# Patient Record
(demographics unavailable — no encounter records)

---

## 2024-10-10 NOTE — HISTORY OF PRESENT ILLNESS
[N/A] : N/A [Denies] : Denies [No] : No [Yes] : Yes [Declined] : Declined [Left upper extremity] : Left upper extremity [24g] : 24g [Start Time: ___] : Medication Start Time: [unfilled] [End Time: ___] : Medication End Time: [unfilled] [Medication Name: ___] : Medication Name: [unfilled] [Total Amount Administered: ___] : Total Amount Administered: [unfilled] [IV discontinued. Intact. No signs or symptoms of IV complications noted. Time: ___] : IV discontinued. Intact. No signs or symptoms of IV complications noted. Time: [unfilled] [Patient  instructed to seek medical attention with signs and symptoms of adverse effects] : Patient  instructed to seek medical attention with signs and symptoms of adverse effects [Patient left unit in no acute distress] : Patient left unit in no acute distress [Medications administered as ordered and tolerated well.] : Medications administered as ordered and tolerated well. [Blood drawn at time of visit] : Blood drawn at time of visit [Influenza] : Influenza [de-identified] : Left cephalic [de-identified] : Patient presents for Orencia infusion. Patient reports to be doing well, denies any siginificant changes since last visit. Patient denies of having any complaints or symptoms today. Denies recent infection or abx use. Patient requested for flu vaccination; administered on left deltoid IM. Patient tolerated well, no immediate AEs noted. Patient tolerated infusion well, left unit ambulatory in [facility].

## 2024-11-07 NOTE — HISTORY OF PRESENT ILLNESS
[N/A] : N/A [Denies] : Denies [No] : No [Yes] : Yes [Declined] : Declined [Left upper extremity] : Left upper extremity [24g] : 24g [Start Time: ___] : Medication Start Time: [unfilled] [End Time: ___] : Medication End Time: [unfilled] [Medication Name: ___] : Medication Name: [unfilled] [Total Amount Administered: ___] : Total Amount Administered: [unfilled] [IV discontinued. Intact. No signs or symptoms of IV complications noted. Time: ___] : IV discontinued. Intact. No signs or symptoms of IV complications noted. Time: [unfilled] [Patient  instructed to seek medical attention with signs and symptoms of adverse effects] : Patient  instructed to seek medical attention with signs and symptoms of adverse effects [Patient left unit in no acute distress] : Patient left unit in no acute distress [Medications administered as ordered and tolerated well.] : Medications administered as ordered and tolerated well. [Blood drawn at time of visit] : Blood drawn at time of visit [de-identified] : Left cephalic [de-identified] : Patient presents for Orencia infusion. Patient reports to be doing well, denies any significant changes since last visit. Patient denies of having any complaints or symptoms today. Denies recent infection or abx use. Patient tolerated infusion well, left unit ambulatory in [facility].

## 2024-12-05 NOTE — HISTORY OF PRESENT ILLNESS
[N/A] : N/A [Denies] : Denies [No] : No [Yes] : Yes [Declined] : Declined [Left upper extremity] : Left upper extremity [24g] : 24g [Medication Name: ___] : Medication Name: [unfilled] [Total Amount Administered: ___] : Total Amount Administered: [unfilled] [Patient  instructed to seek medical attention with signs and symptoms of adverse effects] : Patient  instructed to seek medical attention with signs and symptoms of adverse effects [Patient left unit in no acute distress] : Patient left unit in no acute distress [Medications administered as ordered and tolerated well.] : Medications administered as ordered and tolerated well. [Start Time: ___] : Medication Start Time: [unfilled] [End Time: ___] : Medication End Time: [unfilled] [IV discontinued. Intact. No signs or symptoms of IV complications noted. Time: ___] : IV discontinued. Intact. No signs or symptoms of IV complications noted. Time: [unfilled] [de-identified] : Left cephalic [de-identified] : Patient presents for scheduled Orencia infusion in [facility]. Patient reports to be doing well, denies any significant changes since last visit. Patient denies any recent infections, ABX or hospitalizations. Patient admits to morning stiffness lasting 15-20 minutes each day, but states it gets better as she gets moving.  Patient tolerated infusion well, left unit ambulatory in [facility] upon medication completion.

## 2025-01-16 NOTE — HISTORY OF PRESENT ILLNESS
[N/A] : N/A [Denies] : Denies [No] : No [Yes] : Yes [Informed consent documented in EHR.] : Informed consent documented in EHR. [Left upper extremity] : Left upper extremity [24g] : 24g [Start Time: ___] : Medication Start Time: [unfilled] [End Time: ___] : Medication End Time: [unfilled] [Medication Name: ___] : Medication Name: [unfilled] [Total Amount Administered: ___] : Total Amount Administered: [unfilled] [IV discontinued. Intact. No signs or symptoms of IV complications noted. Time: ___] : IV discontinued. Intact. No signs or symptoms of IV complications noted. Time: [unfilled] [Patient  instructed to seek medical attention with signs and symptoms of adverse effects] : Patient  instructed to seek medical attention with signs and symptoms of adverse effects [Patient left unit in no acute distress] : Patient left unit in no acute distress [Medications administered as ordered and tolerated well.] : Medications administered as ordered and tolerated well. [Blood drawn at time of visit] : Blood drawn at time of visit [de-identified] : flu about 2 weeks ago  [de-identified] : Left arm cephalic vein  [de-identified] :  Labs drawn as prescribed. [de-identified] : Patient presents for scheduled Orencia infusion in [facility]. Patient reports having flu, at first, she was treated with ABX and then steroids were added d/t severe dry cough. Today, patient reports to be doing well, denies joints pain, swelling or stiffness. Patient tolerated infusion well, left unit ambulatory in [facility] upon medication completion.

## 2025-02-13 NOTE — REASON FOR VISIT
[Patient presents for infusion] : infusion therapy as documented below Implemented All Universal Safety Interventions:  [place] to call system. Call bell, personal items and telephone within reach. Instruct patient to call for assistance. Room bathroom lighting operational. Non-slip footwear when patient is off stretcher. Physically safe environment: no spills, clutter or unnecessary equipment. Stretcher in lowest position, wheels locked, appropriate side rails in place.

## 2025-02-13 NOTE — HISTORY OF PRESENT ILLNESS
[N/A] : N/A [Denies] : Denies [No] : No [Yes] : Yes [Declined] : Declined [Right upper extremity] : Right upper extremity [24g] : 24g [Start Time: ___] : Medication Start Time: [unfilled] [End Time: ___] : Medication End Time: [unfilled] [Medication Name: ___] : Medication Name: [unfilled] [Total Amount Administered: ___] : Total Amount Administered: [unfilled] [IV discontinued. Intact. No signs or symptoms of IV complications noted. Time: ___] : IV discontinued. Intact. No signs or symptoms of IV complications noted. Time: [unfilled] [Patient  instructed to seek medical attention with signs and symptoms of adverse effects] : Patient  instructed to seek medical attention with signs and symptoms of adverse effects [Patient left unit in no acute distress] : Patient left unit in no acute distress [Medications administered as ordered and tolerated well.] : Medications administered as ordered and tolerated well. [Blood drawn at time of visit] : Blood drawn at time of visit [de-identified] : Left arm cephalic vein  [de-identified] : Patient presents for scheduled Orencia infusion ambulatory in [facility].  Patient denies any recent infections, ABX use or hospitalizations. Patient admits to stiffness today but denies any pain or swelling. Patient tolerated infusion well, left unit ambulatory in [facility] upon medication completion.

## 2025-03-13 NOTE — HISTORY OF PRESENT ILLNESS
[N/A] : N/A [Denies] : Denies [No] : No [Yes] : Yes [Informed consent documented in EHR.] : Informed consent documented in EHR. [24g] : 24g [Start Time: ___] : Medication Start Time: [unfilled] [End Time: ___] : Medication End Time: [unfilled] [Medication Name: ___] : Medication Name: [unfilled] [Total Amount Administered: ___] : Total Amount Administered: [unfilled] [IV discontinued. Intact. No signs or symptoms of IV complications noted. Time: ___] : IV discontinued. Intact. No signs or symptoms of IV complications noted. Time: [unfilled] [Patient  instructed to seek medical attention with signs and symptoms of adverse effects] : Patient  instructed to seek medical attention with signs and symptoms of adverse effects [Patient left unit in no acute distress] : Patient left unit in no acute distress [Medications administered as ordered and tolerated well.] : Medications administered as ordered and tolerated well. [Blood drawn at time of visit] : Blood drawn at time of visit [de-identified] : Left arm cephalic vein  [de-identified] :  Labs drawn as prescribed. [de-identified] : Patient presents for scheduled Orencia infusion ambulatory in [facility]. Patient admits to stiffness today but denies any joints pain or swelling. Patient continues to reports moderate daily fatigue. No other concerns or symptoms verbalized.  Medication administered as prescribed, and infusion tolerated well.

## 2025-04-10 NOTE — HISTORY OF PRESENT ILLNESS
[N/A] : N/A [Denies] : Denies [No] : No [Yes] : Yes [Informed consent documented in EHR.] : Informed consent documented in EHR. [24g] : 24g [Start Time: ___] : Medication Start Time: [unfilled] [End Time: ___] : Medication End Time: [unfilled] [Medication Name: ___] : Medication Name: [unfilled] [Total Amount Administered: ___] : Total Amount Administered: [unfilled] [IV discontinued. Intact. No signs or symptoms of IV complications noted. Time: ___] : IV discontinued. Intact. No signs or symptoms of IV complications noted. Time: [unfilled] [Patient  instructed to seek medical attention with signs and symptoms of adverse effects] : Patient  instructed to seek medical attention with signs and symptoms of adverse effects [Patient left unit in no acute distress] : Patient left unit in no acute distress [Medications administered as ordered and tolerated well.] : Medications administered as ordered and tolerated well. [de-identified] : Left arm cephalic vein  [de-identified] : Patient presents for scheduled Orencia infusion ambulatory in [facility]. Patient admits to morning stiffness, relief by movements. Patient denies any joints pain or swelling. No other concerns or symptoms verbalized.  Medication administered as prescribed, and infusion tolerated well.

## 2025-05-08 NOTE — HISTORY OF PRESENT ILLNESS
[N/A] : N/A [Denies] : Denies [No] : No [Yes] : Yes [Informed consent documented in EHR.] : Informed consent documented in EHR. [TB] : Tuberculosis screening [Hep acute panel] : Hepatitis acute panel [Total Hep B core AB] : total Hepatitis B Core antibody [24g] : 24g [Start Time: ___] : Medication Start Time: [unfilled] [End Time: ___] : Medication End Time: [unfilled] [Medication Name: ___] : Medication Name: [unfilled] [Total Amount Administered: ___] : Total Amount Administered: [unfilled] [IV discontinued. Intact. No signs or symptoms of IV complications noted. Time: ___] : IV discontinued. Intact. No signs or symptoms of IV complications noted. Time: [unfilled] [Patient  instructed to seek medical attention with signs and symptoms of adverse effects] : Patient  instructed to seek medical attention with signs and symptoms of adverse effects [Patient left unit in no acute distress] : Patient left unit in no acute distress [Medications administered as ordered and tolerated well.] : Medications administered as ordered and tolerated well. [Blood drawn at time of visit] : Blood drawn at time of visit [de-identified] : Left arm cephalic vein  [de-identified] :  Labs drawn as prescribed. [de-identified] : Patient presents for scheduled Orencia infusion ambulatory in [facility]. Patient admits to morning stiffness and after prolong sitting, relief by movements. Patient denies any joints pain or swelling. Reports moderate daily fatigue. No other concerns or symptoms verbalized.  Medication administered as prescribed, and infusion tolerated well.

## 2025-06-10 NOTE — HISTORY OF PRESENT ILLNESS
[FreeTextEntry1] : fuv-telehealth    This visit was provided via telehealth using real-time 2-way audio visual technology. The patient, SAMMI MICHAEL was located at home in [place] at the time of the visit. The provider, DR. Hanna Lucia, was located at [place], [place] at the time of the visit. The patient and Provider participated in the telehealth encounter. Verbal consent for telehealth services was given by the patient.  Pt's camera was not working at time of visit.  [de-identified] : SAMMI MICHAEL is an 81 yo woman with hypertension, RA, CRI that requested a call to discuss recent medical condition.  The patient was taking doxycycline for UTI when she developed chest pain with swallowing.  Seen at urgent care and given omeprazole for spasm/gastritis.  No further episodes.  Feeling well.  She has been well overall.    The patient sees rheum Dr Starr for RA.     The patient is  with 2 sons and 5 grandchildren.  She is retired from real estate. She would have no difficulty walking 4 to 6 blocks.

## 2025-07-03 NOTE — HISTORY OF PRESENT ILLNESS
[N/A] : N/A [Denies] : Denies [No] : No [Yes] : Yes [Informed consent documented in EHR.] : Informed consent documented in EHR. [24g] : 24g [Start Time: ___] : Medication Start Time: [unfilled] [End Time: ___] : Medication End Time: [unfilled] [Medication Name: ___] : Medication Name: [unfilled] [Total Amount Administered: ___] : Total Amount Administered: [unfilled] [IV discontinued. Intact. No signs or symptoms of IV complications noted. Time: ___] : IV discontinued. Intact. No signs or symptoms of IV complications noted. Time: [unfilled] [Patient  instructed to seek medical attention with signs and symptoms of adverse effects] : Patient  instructed to seek medical attention with signs and symptoms of adverse effects [Patient left unit in no acute distress] : Patient left unit in no acute distress [Medications administered as ordered and tolerated well.] : Medications administered as ordered and tolerated well. [Blood drawn at time of visit] : Blood drawn at time of visit [de-identified] : right arm cephalic vein  [de-identified] :  Labs drawn as prescribed.  [de-identified] : Patient presents for scheduled Orencia infusion, ambulatory in [facility]. Today, patient reports to be doing well, however continues to reports joints stiffness in the morning and moderate daily fatigue, denies pain. No other concerns or symptoms verbalized. Patient tolerated infusion well, left unit ambulatory in [facility].